# Patient Record
Sex: MALE | Race: BLACK OR AFRICAN AMERICAN | NOT HISPANIC OR LATINO | Employment: OTHER | URBAN - METROPOLITAN AREA
[De-identification: names, ages, dates, MRNs, and addresses within clinical notes are randomized per-mention and may not be internally consistent; named-entity substitution may affect disease eponyms.]

---

## 2020-07-11 PROBLEM — I50.9 ACUTE CONGESTIVE HEART FAILURE: Status: ACTIVE | Noted: 2020-07-11

## 2020-07-12 PROBLEM — I10 HTN (HYPERTENSION): Status: ACTIVE | Noted: 2020-07-12

## 2020-07-12 PROBLEM — J81.1 PULMONARY EDEMA: Status: ACTIVE | Noted: 2020-07-12

## 2020-07-14 PROBLEM — J81.1 PULMONARY EDEMA: Status: RESOLVED | Noted: 2020-07-12 | Resolved: 2020-07-14

## 2021-02-17 PROBLEM — Z59.00 HOMELESSNESS: Status: ACTIVE | Noted: 2021-02-17

## 2021-02-17 PROBLEM — I16.1 HYPERTENSIVE EMERGENCY: Status: ACTIVE | Noted: 2020-07-12

## 2021-02-17 PROBLEM — I10 HYPERTENSION: Status: ACTIVE | Noted: 2021-02-17

## 2021-02-17 PROBLEM — I50.33 ACUTE ON CHRONIC DIASTOLIC CONGESTIVE HEART FAILURE: Status: ACTIVE | Noted: 2020-07-11

## 2021-02-17 PROBLEM — Z72.0 TOBACCO USE: Status: ACTIVE | Noted: 2021-02-17

## 2021-02-17 PROBLEM — R07.89 NON-CARDIAC CHEST PAIN: Status: ACTIVE | Noted: 2021-02-17

## 2021-02-17 PROBLEM — D69.6 THROMBOCYTOPENIA: Status: ACTIVE | Noted: 2021-02-17

## 2021-02-17 PROBLEM — R73.03 PREDIABETES: Status: ACTIVE | Noted: 2021-02-17

## 2021-02-17 PROBLEM — J44.9 COPD (CHRONIC OBSTRUCTIVE PULMONARY DISEASE): Status: ACTIVE | Noted: 2021-02-17

## 2021-02-17 PROBLEM — E66.9 OBESITY: Status: ACTIVE | Noted: 2021-02-17

## 2021-02-17 PROBLEM — Z86.73 HISTORY OF STROKE: Status: ACTIVE | Noted: 2021-02-17

## 2021-02-19 PROBLEM — Z59.00 HOMELESSNESS: Status: RESOLVED | Noted: 2021-02-17 | Resolved: 2021-02-19

## 2021-02-20 PROBLEM — D69.6 THROMBOCYTOPENIA: Status: RESOLVED | Noted: 2021-02-17 | Resolved: 2021-02-20

## 2021-02-20 PROBLEM — R73.03 PREDIABETES: Status: RESOLVED | Noted: 2021-02-17 | Resolved: 2021-02-20

## 2021-02-20 PROBLEM — R07.89 NON-CARDIAC CHEST PAIN: Status: RESOLVED | Noted: 2021-02-17 | Resolved: 2021-02-20

## 2021-02-20 PROBLEM — I50.33 ACUTE ON CHRONIC DIASTOLIC CONGESTIVE HEART FAILURE: Status: RESOLVED | Noted: 2020-07-11 | Resolved: 2021-02-20

## 2021-02-20 PROBLEM — Z72.0 TOBACCO USE: Status: RESOLVED | Noted: 2021-02-17 | Resolved: 2021-02-20

## 2021-02-20 PROBLEM — I16.1 HYPERTENSIVE EMERGENCY: Status: RESOLVED | Noted: 2020-07-12 | Resolved: 2021-02-20

## 2021-06-12 PROBLEM — R06.02 SHORTNESS OF BREATH: Status: ACTIVE | Noted: 2021-06-12

## 2021-06-12 PROBLEM — R74.01 TRANSAMINITIS: Status: ACTIVE | Noted: 2021-06-12

## 2021-10-16 PROBLEM — I47.10 SVT (SUPRAVENTRICULAR TACHYCARDIA): Status: ACTIVE | Noted: 2021-10-16

## 2021-10-16 PROBLEM — R06.02 SOB (SHORTNESS OF BREATH): Status: ACTIVE | Noted: 2021-10-16

## 2021-10-18 PROBLEM — R10.11 RUQ PAIN: Status: ACTIVE | Noted: 2021-10-18

## 2021-11-18 PROBLEM — R07.9 CHEST PAIN: Status: ACTIVE | Noted: 2021-02-17

## 2021-11-18 PROBLEM — I16.9 HYPERTENSIVE CRISIS: Status: ACTIVE | Noted: 2021-02-17

## 2021-11-29 PROBLEM — I49.8 BIGEMINY: Status: ACTIVE | Noted: 2021-11-29

## 2021-11-29 PROBLEM — I50.813 ACUTE ON CHRONIC RIGHT HEART FAILURE: Status: ACTIVE | Noted: 2021-11-29

## 2021-12-02 PROBLEM — I11.9 HYPERTENSIVE HEART DISEASE: Status: ACTIVE | Noted: 2021-12-02

## 2021-12-02 PROBLEM — I25.9 ISCHEMIC HEART DISEASE: Status: ACTIVE | Noted: 2021-02-17

## 2022-01-12 PROBLEM — E78.5 HLD (HYPERLIPIDEMIA): Status: ACTIVE | Noted: 2022-01-12

## 2022-01-13 PROBLEM — R06.02 SHORTNESS OF BREATH: Status: RESOLVED | Noted: 2021-06-12 | Resolved: 2022-01-13

## 2022-01-13 PROBLEM — I50.9 CHF (CONGESTIVE HEART FAILURE): Status: ACTIVE | Noted: 2021-09-25

## 2022-01-13 PROBLEM — E78.5 DYSLIPIDEMIA: Status: ACTIVE | Noted: 2022-01-13

## 2022-01-30 PROBLEM — I49.3 PVC (PREMATURE VENTRICULAR CONTRACTION): Status: ACTIVE | Noted: 2022-01-30

## 2022-01-30 PROBLEM — M25.562 ACUTE PAIN OF LEFT KNEE: Status: ACTIVE | Noted: 2022-01-30

## 2022-02-01 ENCOUNTER — PATIENT MESSAGE (OUTPATIENT)
Dept: ADMINISTRATIVE | Facility: CLINIC | Age: 57
End: 2022-02-01

## 2022-02-01 ENCOUNTER — TELEPHONE (OUTPATIENT)
Dept: ADMINISTRATIVE | Facility: CLINIC | Age: 57
End: 2022-02-01

## 2022-02-02 ENCOUNTER — NURSE TRIAGE (OUTPATIENT)
Dept: ADMINISTRATIVE | Facility: CLINIC | Age: 57
End: 2022-02-02

## 2022-02-02 NOTE — TELEPHONE ENCOUNTER
2nd enrollment call attempted with no contact made. VM left, and previous [employer]t message sent. Will enroll into [facility] program    Reason for Disposition   Message left on unidentified voice mail. Phone number verified.    Protocols used: NO CONTACT OR DUPLICATE CONTACT CALL-A-[place]

## 2022-04-02 PROBLEM — M79.18 MUSCULOSKELETAL PAIN: Status: ACTIVE | Noted: 2022-04-02

## 2022-04-10 PROBLEM — I50.9 CHF EXACERBATION: Status: RESOLVED | Noted: 2020-07-11 | Resolved: 2022-04-10

## 2022-04-18 PROBLEM — E87.5 HYPERKALEMIA: Status: ACTIVE | Noted: 2022-04-18

## 2022-04-18 PROBLEM — I50.23 ACUTE ON CHRONIC SYSTOLIC HEART FAILURE: Status: ACTIVE | Noted: 2022-04-18

## 2022-04-28 PROBLEM — F33.2 SEVERE EPISODE OF RECURRENT MAJOR DEPRESSIVE DISORDER, WITHOUT PSYCHOTIC FEATURES: Status: ACTIVE | Noted: 2022-04-28

## 2022-04-28 PROBLEM — I27.20 PULMONARY HYPERTENSION: Status: ACTIVE | Noted: 2022-04-28

## 2022-06-15 PROBLEM — R55 SYNCOPE AND COLLAPSE: Status: ACTIVE | Noted: 2022-06-15

## 2022-06-16 PROBLEM — R55 SYNCOPE: Status: RESOLVED | Noted: 2022-06-15 | Resolved: 2022-06-16

## 2022-06-16 PROBLEM — I49.3 PREMATURE VENTRICULAR COMPLEX: Status: ACTIVE | Noted: 2021-11-29

## 2022-07-27 PROBLEM — E87.6 HYPOKALEMIA: Status: ACTIVE | Noted: 2022-07-27

## 2022-07-28 PROBLEM — I50.33 ACUTE ON CHRONIC DIASTOLIC CONGESTIVE HEART FAILURE: Status: ACTIVE | Noted: 2021-09-25

## 2022-08-20 PROBLEM — E87.3 METABOLIC ALKALOSIS: Status: ACTIVE | Noted: 2022-08-20

## 2022-08-21 PROBLEM — R06.02 SOB (SHORTNESS OF BREATH): Status: RESOLVED | Noted: 2021-10-16 | Resolved: 2022-08-21

## 2022-08-21 PROBLEM — E87.3 METABOLIC ALKALOSIS: Status: RESOLVED | Noted: 2022-08-20 | Resolved: 2022-08-21

## 2022-08-22 PROBLEM — R55 SYNCOPE: Status: ACTIVE | Noted: 2022-08-22

## 2022-08-22 PROBLEM — I50.42 CHRONIC COMBINED SYSTOLIC AND DIASTOLIC HEART FAILURE: Status: ACTIVE | Noted: 2022-08-22

## 2022-08-24 PROBLEM — I50.43 ACUTE ON CHRONIC COMBINED SYSTOLIC AND DIASTOLIC CONGESTIVE HEART FAILURE: Status: ACTIVE | Noted: 2022-08-22

## 2022-08-25 PROBLEM — I10 HYPERTENSION: Status: RESOLVED | Noted: 2021-02-17 | Resolved: 2022-08-25

## 2022-10-15 PROBLEM — I16.0 HYPERTENSIVE URGENCY: Status: ACTIVE | Noted: 2022-10-15

## 2022-11-03 PROBLEM — D64.9 NORMOCYTIC ANEMIA: Status: ACTIVE | Noted: 2022-11-03

## 2022-11-03 PROBLEM — R11.0 NAUSEA: Status: ACTIVE | Noted: 2022-11-03

## 2022-11-04 PROBLEM — I16.0 HYPERTENSIVE URGENCY: Status: RESOLVED | Noted: 2022-10-15 | Resolved: 2022-11-04

## 2022-11-04 PROBLEM — I50.9 CHF EXACERBATION: Status: RESOLVED | Noted: 2020-07-11 | Resolved: 2022-11-04

## 2022-11-04 PROBLEM — R11.0 NAUSEA: Status: RESOLVED | Noted: 2022-11-03 | Resolved: 2022-11-04

## 2022-11-04 PROBLEM — R94.31 QT PROLONGATION: Status: ACTIVE | Noted: 2022-11-04

## 2022-12-16 PROBLEM — I49.3 PVC'S (PREMATURE VENTRICULAR CONTRACTIONS): Status: RESOLVED | Noted: 2022-01-30 | Resolved: 2022-12-16

## 2022-12-16 PROBLEM — K42.9 UMBILICAL HERNIA: Status: ACTIVE | Noted: 2022-12-16

## 2022-12-16 PROBLEM — I50.9 ACUTE ON CHRONIC HEART FAILURE: Status: ACTIVE | Noted: 2022-12-16

## 2022-12-17 PROBLEM — I50.33 ACUTE ON CHRONIC DIASTOLIC HEART FAILURE: Status: ACTIVE | Noted: 2022-12-16

## 2022-12-18 PROBLEM — I16.0 HYPERTENSIVE URGENCY: Status: RESOLVED | Noted: 2022-10-15 | Resolved: 2022-12-18

## 2022-12-18 PROBLEM — E87.6 HYPOKALEMIA: Status: RESOLVED | Noted: 2022-07-27 | Resolved: 2022-12-18

## 2022-12-19 PROBLEM — I50.33 ACUTE ON CHRONIC DIASTOLIC HEART FAILURE: Status: RESOLVED | Noted: 2022-12-16 | Resolved: 2022-12-19

## 2023-01-13 PROBLEM — E87.70 HYPERVOLEMIA: Status: ACTIVE | Noted: 2023-01-13

## 2023-01-13 PROBLEM — R91.8 PULMONARY NODULES: Status: ACTIVE | Noted: 2023-01-13

## 2023-01-13 PROBLEM — R05.1 ACUTE COUGH: Status: ACTIVE | Noted: 2023-01-13

## 2023-01-13 PROBLEM — E88.09 HYPOALBUMINEMIA: Status: ACTIVE | Noted: 2023-01-13

## 2023-01-15 PROBLEM — I16.0 HYPERTENSIVE URGENCY: Status: RESOLVED | Noted: 2022-10-15 | Resolved: 2023-01-15

## 2023-01-17 ENCOUNTER — PATIENT OUTREACH (OUTPATIENT)
Dept: ADMINISTRATIVE | Facility: CLINIC | Age: 58
End: 2023-01-17

## 2023-01-31 PROBLEM — M25.569 KNEE PAIN: Status: ACTIVE | Noted: 2023-01-31

## 2023-01-31 PROBLEM — M25.562 LEFT KNEE PAIN: Status: ACTIVE | Noted: 2023-01-31

## 2023-01-31 PROBLEM — I50.40 COMBINED SYSTOLIC AND DIASTOLIC CONGESTIVE HEART FAILURE: Status: ACTIVE | Noted: 2023-01-31

## 2023-01-31 PROBLEM — I10 HTN (HYPERTENSION): Status: ACTIVE | Noted: 2023-01-31

## 2023-02-01 PROBLEM — J44.0 CHRONIC OBSTRUCTIVE PULMONARY DISEASE WITH ACUTE LOWER RESPIRATORY INFECTION: Status: ACTIVE | Noted: 2021-02-17

## 2023-02-03 PROBLEM — R68.89 FLU-LIKE SYMPTOMS: Status: ACTIVE | Noted: 2023-02-03

## 2023-02-06 ENCOUNTER — PATIENT OUTREACH (OUTPATIENT)
Dept: ADMINISTRATIVE | Facility: CLINIC | Age: 58
End: 2023-02-06

## 2023-02-06 NOTE — PROGRESS NOTES
C3 nurse attempted to contact [facility]or [facility] for a TCC post hospital discharge follow up call. No answer. Left voicemail with callback information. The patient has a scheduled HOSFU appointment with Kim Cameron on 02/10/2023 @ 1330.    No additional TCC outreach attempts will be made on this encounter as there are no valid contact numbers to reach the patient at this time & unfortunately, per the patient's notes in the chart, the patient is homeless at this time.

## 2023-03-09 PROBLEM — I50.23 ACUTE ON CHRONIC SYSTOLIC CONGESTIVE HEART FAILURE: Status: RESOLVED | Noted: 2022-04-18 | Resolved: 2023-03-09

## 2023-03-09 PROBLEM — J44.0 CHRONIC OBSTRUCTIVE PULMONARY DISEASE WITH ACUTE LOWER RESPIRATORY INFECTION: Status: RESOLVED | Noted: 2021-02-17 | Resolved: 2023-03-09

## 2023-03-21 ENCOUNTER — SOCIAL WORK (OUTPATIENT)
Dept: ADMINISTRATIVE | Facility: OTHER | Age: 58
End: 2023-03-21

## 2023-03-21 NOTE — PROGRESS NOTES
SW received consult for homelessness. SW met with Dr. Gustfason within clinic and states that pt has had multiple ER visits due to homelessness. SW placed a call to pt @ [phone] to provide assistance. Pt states he has been in communication with [email or web address] and states he is needing to be verified as homeless. Pt states he was informed it would take 7 to 10 business days but it has been a month and he has not been street verified. SW offered two other shelter resources ([facility] [place] [facility] and [facility]) and pt states he was uninterested at this time. Pt states he will call [email or web address] again on tomorrow. No other needs at this time.     Osbaldo Huitron, [facility] [place]    [phone] (phone)  [phone] (fax)

## 2023-04-04 PROBLEM — I50.33 HEART FAILURE, DIASTOLIC, WITH ACUTE DECOMPENSATION: Status: ACTIVE | Noted: 2023-04-04

## 2023-04-04 PROBLEM — E87.5 HYPERKALEMIA: Status: RESOLVED | Noted: 2022-04-18 | Resolved: 2023-04-04

## 2023-04-04 PROBLEM — R06.09 DYSPNEA ON EXERTION: Status: ACTIVE | Noted: 2021-06-12

## 2023-04-04 PROBLEM — I50.9 ACUTE DECOMPENSATED HEART FAILURE: Status: ACTIVE | Noted: 2021-11-29

## 2023-04-04 PROBLEM — Z60.9 HIGH RISK SOCIAL SITUATION: Status: ACTIVE | Noted: 2023-04-04

## 2023-04-04 PROBLEM — D50.9 IRON DEFICIENCY ANEMIA: Status: ACTIVE | Noted: 2023-04-04

## 2023-04-04 PROBLEM — Z86.79 HISTORY OF HEART FAILURE: Status: ACTIVE | Noted: 2023-04-04

## 2023-04-07 PROBLEM — N17.9 AKI (ACUTE KIDNEY INJURY): Status: ACTIVE | Noted: 2023-04-07

## 2023-04-07 PROBLEM — I50.32 CHRONIC DIASTOLIC HEART FAILURE: Status: RESOLVED | Noted: 2022-08-22 | Resolved: 2023-04-07

## 2023-04-07 PROBLEM — I50.32 CHRONIC DIASTOLIC HEART FAILURE: Status: ACTIVE | Noted: 2022-08-22

## 2023-04-07 PROBLEM — E03.9 HYPOTHYROIDISM: Status: ACTIVE | Noted: 2023-04-07

## 2023-04-07 PROBLEM — I95.9 HYPOTENSION: Status: ACTIVE | Noted: 2023-04-07

## 2023-04-07 PROBLEM — R55 SYNCOPE: Status: RESOLVED | Noted: 2022-06-15 | Resolved: 2023-04-07

## 2023-05-08 PROBLEM — J18.9 COMMUNITY ACQUIRED PNEUMONIA: Status: ACTIVE | Noted: 2023-05-08

## 2023-05-08 PROBLEM — J44.1 CHRONIC OBSTRUCTIVE PULMONARY DISEASE WITH ACUTE EXACERBATION: Status: ACTIVE | Noted: 2021-02-17

## 2023-05-08 PROBLEM — R50.9 FEVER: Status: ACTIVE | Noted: 2023-05-08

## 2023-05-09 PROBLEM — I10 HTN (HYPERTENSION): Status: RESOLVED | Noted: 2023-01-31 | Resolved: 2023-05-09

## 2023-05-09 PROBLEM — I10 PRIMARY HYPERTENSION: Status: ACTIVE | Noted: 2023-05-09

## 2023-05-10 PROBLEM — R93.89 ABNORMAL CT SCAN: Status: ACTIVE | Noted: 2023-05-10

## 2023-05-10 PROBLEM — I10 PRIMARY HYPERTENSION: Status: RESOLVED | Noted: 2023-05-09 | Resolved: 2023-05-10

## 2023-05-11 PROBLEM — R42 DIZZINESS: Status: ACTIVE | Noted: 2023-05-11

## 2023-05-11 PROBLEM — R50.9 FEVER: Status: RESOLVED | Noted: 2023-05-08 | Resolved: 2023-05-11

## 2023-05-11 PROBLEM — M17.0 PRIMARY OSTEOARTHRITIS OF BOTH KNEES: Status: ACTIVE | Noted: 2023-05-11

## 2023-05-11 PROBLEM — R26.81 UNSTEADY GAIT WHEN WALKING: Status: ACTIVE | Noted: 2023-05-11

## 2023-07-10 PROBLEM — N17.9 AKI (ACUTE KIDNEY INJURY): Status: RESOLVED | Noted: 2023-04-07 | Resolved: 2023-07-10

## 2023-08-14 PROBLEM — J18.9 COMMUNITY ACQUIRED PNEUMONIA: Status: RESOLVED | Noted: 2023-05-08 | Resolved: 2023-08-14

## 2023-09-27 PROBLEM — I50.9 CHF, ACUTE ON CHRONIC: Status: ACTIVE | Noted: 2023-09-27

## 2023-09-27 PROBLEM — I50.9 ACUTE ON CHRONIC HEART FAILURE: Status: RESOLVED | Noted: 2021-11-29 | Resolved: 2023-09-27

## 2023-09-27 PROBLEM — I16.9 HYPERTENSIVE CRISIS: Status: ACTIVE | Noted: 2023-09-27

## 2023-10-25 PROBLEM — I49.9 CARDIAC ARRHYTHMIA: Status: ACTIVE | Noted: 2023-10-25

## 2023-10-27 PROBLEM — I50.33 ACUTE ON CHRONIC DIASTOLIC CONGESTIVE HEART FAILURE: Status: RESOLVED | Noted: 2021-09-25 | Resolved: 2023-10-27

## 2023-10-30 ENCOUNTER — PATIENT OUTREACH (OUTPATIENT)
Dept: ADMINISTRATIVE | Facility: CLINIC | Age: 58
End: 2023-10-30
Payer: COMMERCIAL

## 2023-10-30 NOTE — PROGRESS NOTES
C3 nurse attempted to contact [facility] for a TCC post hospital discharge follow up call. No answer. No voicemail available.The patient does not have a scheduled HOSFU appointment. Message sent to PCP staff for assistance with scheduling visit with patient.

## 2024-01-04 ENCOUNTER — PATIENT OUTREACH (OUTPATIENT)
Dept: ADMINISTRATIVE | Facility: CLINIC | Age: 59
End: 2024-01-04
Payer: COMMERCIAL

## 2024-01-04 NOTE — PROGRESS NOTES
C3 nurse attempted to contact [facility]or [facility] for a TCC post hospital discharge follow up call. No answer. The patient does not have a scheduled HOSFU appointment, and the pt does not have an [facility] PCP.

## 2024-01-05 NOTE — PROGRESS NOTES
3rd attempt for TCC Call; No answer.  No voicemail. 3rd attempt to contact patient. Manually enrolled in Post Discharge Tracker.

## 2024-01-27 PROBLEM — R91.8 LEFT UPPER LOBE PULMONARY INFILTRATE: Status: ACTIVE | Noted: 2024-01-27

## 2024-02-01 ENCOUNTER — PATIENT OUTREACH (OUTPATIENT)
Dept: ADMINISTRATIVE | Facility: CLINIC | Age: 59
End: 2024-02-01
Payer: COMMERCIAL

## 2024-02-22 PROBLEM — I16.0 HYPERTENSIVE URGENCY: Status: RESOLVED | Noted: 2022-10-15 | Resolved: 2024-02-22

## 2024-08-30 PROBLEM — I10 UNCONTROLLED HYPERTENSION: Status: ACTIVE | Noted: 2024-08-30

## 2024-09-20 PROBLEM — Z87.09 HISTORY OF COPD: Status: ACTIVE | Noted: 2024-09-20

## 2024-09-20 PROBLEM — I50.23 ACUTE ON CHRONIC SYSTOLIC CHF (CONGESTIVE HEART FAILURE): Status: RESOLVED | Noted: 2022-04-18 | Resolved: 2024-09-20
